# Patient Record
Sex: MALE | Race: OTHER | NOT HISPANIC OR LATINO | URBAN - METROPOLITAN AREA
[De-identification: names, ages, dates, MRNs, and addresses within clinical notes are randomized per-mention and may not be internally consistent; named-entity substitution may affect disease eponyms.]

---

## 2021-03-07 ENCOUNTER — EMERGENCY (EMERGENCY)
Facility: HOSPITAL | Age: 51
LOS: 1 days | Discharge: HOME | End: 2021-03-07
Attending: EMERGENCY MEDICINE | Admitting: EMERGENCY MEDICINE
Payer: MEDICAID

## 2021-03-07 VITALS
RESPIRATION RATE: 18 BRPM | SYSTOLIC BLOOD PRESSURE: 139 MMHG | OXYGEN SATURATION: 98 % | DIASTOLIC BLOOD PRESSURE: 91 MMHG | TEMPERATURE: 98 F | HEART RATE: 88 BPM

## 2021-03-07 DIAGNOSIS — M79.671 PAIN IN RIGHT FOOT: ICD-10-CM

## 2021-03-07 DIAGNOSIS — M79.672 PAIN IN LEFT FOOT: ICD-10-CM

## 2021-03-07 PROCEDURE — L9991: CPT

## 2021-03-07 NOTE — ED ADULT NURSE NOTE - OBJECTIVE STATEMENT
Pt presents c/o bilateral foot pain.  Pt refusing to elaborate to RN.  Pt asks to see doctor only.  Pt provided w/ food while waiting.

## 2021-03-07 NOTE — ED ADULT TRIAGE NOTE - CHIEF COMPLAINT QUOTE
walk in pt complaining of bilateral foot pain. patient has no difficulty ambulating. Refusing to keep his mask over his face, instead drinking his coffee in triage. Asking for food.

## 2022-10-21 ENCOUNTER — EMERGENCY (EMERGENCY)
Facility: HOSPITAL | Age: 52
LOS: 1 days | Discharge: HOME | End: 2022-10-21
Attending: EMERGENCY MEDICINE | Admitting: EMERGENCY MEDICINE

## 2022-10-21 VITALS
TEMPERATURE: 98 F | DIASTOLIC BLOOD PRESSURE: 85 MMHG | SYSTOLIC BLOOD PRESSURE: 115 MMHG | HEART RATE: 78 BPM | RESPIRATION RATE: 18 BRPM | OXYGEN SATURATION: 100 %

## 2022-10-21 VITALS
RESPIRATION RATE: 16 BRPM | HEART RATE: 79 BPM | TEMPERATURE: 98 F | OXYGEN SATURATION: 100 % | DIASTOLIC BLOOD PRESSURE: 65 MMHG | SYSTOLIC BLOOD PRESSURE: 111 MMHG

## 2022-10-21 PROCEDURE — 99284 EMERGENCY DEPT VISIT MOD MDM: CPT

## 2022-10-21 NOTE — ED PROVIDER NOTE - PROGRESS NOTE DETAILS
Fidel Jeong DO (PGY1)  Re-assessed patient. Patient not complaining of any pain. Requesting milk and coffee. Discussed giving patient a shelter list. Discussed patient to follow up with shelter. States he sleeps at the [facility] daily. Patient hemodynamically stable for d/c

## 2022-10-21 NOTE — ED PROVIDER NOTE - ATTENDING CONTRIBUTION TO CARE
51yo M with no PMHX, currently undomiciled, sleeps mostly on streets or in [facility], last used shelter ~1month ago in [place], p/w nasal congestion since this evening but on further questioning asking for food. No ther complaints    EXAM  RRR  lungs cta  no significant nasal congestion noted    a/p  homelessness/uri vs just wants food  will give food and some rest then provide shelter list

## 2022-10-21 NOTE — ED ADULT TRIAGE NOTE - CHIEF COMPLAINT QUOTE
Pt. presents to [facility] ED for H/A  and "not feeling well". Pt. resides in a shelter for unemployed people. Pt. is Khmer speaking. [facility]  services [place] , ID # [id number]. PMH: denies. FS at triage. 114. EKG done. NAD noted. Denies alcohol use or drug use.

## 2022-10-21 NOTE — ED PROVIDER NOTE - NS ED ROS FT
CONSTITUTIONAL: No fevers, no chills, no lightheadedness, no dizziness  EYES: no visual changes, no eye pain  EARS: no ear drainage, no ear pain, no change in hearing  NOSE: +nasal congestion  MOUTH/THROAT: no sore throat  CV: No chest pain, no palpitations  RESP: No SOB, no cough  GI: No n/v/d, no abd pain  : no dysuria, no hematuria, no flank pain  MSK: no back pain, no extremity pain  SKIN: no rashes  NEURO: no headache, no focal weakness, no decreased sensation/parasthesias   PSYCHIATRIC: no known mental health issues

## 2022-10-21 NOTE — ED PROVIDER NOTE - CLINICAL SUMMARY MEDICAL DECISION MAKING FREE TEXT BOX
53 y/o Chinese speaking M with no PMHx presenting today with nasal congestion for 3 hours.    Patient with unremarkable PE.   Patient requesting food.  Plan to feed patient and d/c

## 2022-10-21 NOTE — ED ADULT NURSE NOTE - OBJECTIVE STATEMENT
52 yom presents A&Ox4, coming in from shelter. Pt states he had a headache earlier which resolved, now c/o nasal congestion and hunger. Pt stated "I am hungry, can I have food." Respirations even and unlabored. Pt denies any chest pain, dyspnea, dizziness, blurry vision, N/V/D, recent falls or fever. No acute distress noted. Pt denies any PMHx. Bed in lowest, safety maintained. pt given food and juice.

## 2022-10-21 NOTE — ED PROVIDER NOTE - OBJECTIVE STATEMENT
51 y/o Frisian speaking M presenting today with congestion for 3 hours. States he is from the homeless shelter and has not ate food since 2PM. Denies any chest pain, shortness of breath, fever, chills, abdominal pain, nausea, vomiting, diarrhea, 51 y/o Maltese speaking M with no PMHx presenting today with nasal congestion for 3 hours. States he is from the homeless shelter and has not ate food since 2PM. Denies any chest pain, shortness of breath, fever, chills, abdominal pain, nausea, vomiting, diarrhea, dizziness, syncopal episode. Denies any recent sick contacts. States that he walked from the shelter. Denies any medications OTC.  [facility]  ID [id number]

## 2022-10-21 NOTE — ED ADULT NURSE NOTE - CHIEF COMPLAINT QUOTE
Pt. presents to [facility] ED for H/A  and "not feeling well". Pt. resides in a shelter for unemployed people. Pt. is Turkmen speaking. [facility]  services [place] , ID # [id number]. PMH: denies. FS at triage. 114. EKG done. NAD noted. Denies alcohol use or drug use.

## 2022-10-21 NOTE — ED PROVIDER NOTE - PATIENT PORTAL LINK FT
You can access the FollowMyHealth Patient Portal offered by [facility] by registering at the following website: [email or web address]. By joining [facility]’s FollowMyHealth portal, you will also be able to view your health information using other applications (apps) compatible with our system.